# Patient Record
Sex: FEMALE | Race: WHITE | NOT HISPANIC OR LATINO
[De-identification: names, ages, dates, MRNs, and addresses within clinical notes are randomized per-mention and may not be internally consistent; named-entity substitution may affect disease eponyms.]

---

## 2022-07-18 PROBLEM — Z00.00 ENCOUNTER FOR PREVENTIVE HEALTH EXAMINATION: Status: ACTIVE | Noted: 2022-07-18

## 2022-07-19 ENCOUNTER — APPOINTMENT (OUTPATIENT)
Dept: PEDIATRIC ORTHOPEDIC SURGERY | Facility: CLINIC | Age: 22
End: 2022-07-19

## 2022-07-19 VITALS — WEIGHT: 138 LBS | BODY MASS INDEX: 22.18 KG/M2 | HEIGHT: 66 IN

## 2022-07-19 DIAGNOSIS — Z80.9 FAMILY HISTORY OF MALIGNANT NEOPLASM, UNSPECIFIED: ICD-10-CM

## 2022-07-19 DIAGNOSIS — Z83.49 FAMILY HISTORY OF OTHER ENDOCRINE, NUTRITIONAL AND METABOLIC DISEASES: ICD-10-CM

## 2022-07-19 PROCEDURE — 73564 X-RAY EXAM KNEE 4 OR MORE: CPT

## 2022-07-19 PROCEDURE — 99202 OFFICE O/P NEW SF 15 MIN: CPT

## 2022-07-19 NOTE — DATA REVIEWED
[de-identified] : X-ray evaluation of the right knee on 7/19/2022 (AP, lateral, skyline and tunnel views) reveals a loose fragment of the medial femoral condyle consistent with osteochondritis dissecans.

## 2022-07-19 NOTE — HISTORY OF PRESENT ILLNESS
[FreeTextEntry1] : This 22-year-old female is here for evaluation of right knee pain.  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  Of importance was that she was treated by me in 2016 for an osteochondritis dissecans of the medial femoral condyle which healed with conservative treatment.  She states that the knee is giving way and swells intermittently.  This is causing her significant difficulty with her ability to play sports.

## 2022-07-19 NOTE — ASSESSMENT
[FreeTextEntry1] : Osteochondritis dissecans right medial femoral condyle with loose fragment\par \par It is felt that this patient will require operative intervention but prior to that an MRI should be obtained.  I explained to the patient and her father that the surgery would be done arthroscopically and would entail excision of the loose fragment.  This does not appear to be in a weightbearing area and will not require most likely an oats procedure.

## 2022-07-19 NOTE — PHYSICAL EXAM
[FreeTextEntry1] : On physical examination the patient has tenderness in the region of the medial aspect of the medial femoral condyle.  There is no effusion.  There is no varus valgus or AP instability.

## 2022-07-19 NOTE — CONSULT LETTER
[Dear  ___] : Dear  [unfilled], [Consult Letter:] : I had the pleasure of evaluating your patient, [unfilled]. [Please see my note below.] : Please see my note below. [Consult Closing:] : Thank you very much for allowing me to participate in the care of this patient.  If you have any questions, please do not hesitate to contact me. [Sincerely,] : Sincerely, [FreeTextEntry3] : Dr Woo\par

## 2022-12-30 ENCOUNTER — APPOINTMENT (OUTPATIENT)
Dept: PEDIATRIC ORTHOPEDIC SURGERY | Facility: HOSPITAL | Age: 22
End: 2022-12-30

## 2023-03-10 ENCOUNTER — APPOINTMENT (OUTPATIENT)
Dept: PEDIATRIC ORTHOPEDIC SURGERY | Facility: HOSPITAL | Age: 23
End: 2023-03-10

## 2023-03-10 ENCOUNTER — TRANSCRIPTION ENCOUNTER (OUTPATIENT)
Age: 23
End: 2023-03-10

## 2023-03-16 ENCOUNTER — APPOINTMENT (OUTPATIENT)
Dept: PEDIATRIC ORTHOPEDIC SURGERY | Facility: CLINIC | Age: 23
End: 2023-03-16
Payer: COMMERCIAL

## 2023-03-16 VITALS — TEMPERATURE: 97.3 F | WEIGHT: 138 LBS | HEIGHT: 66 IN | BODY MASS INDEX: 22.18 KG/M2

## 2023-03-16 PROCEDURE — 99024 POSTOP FOLLOW-UP VISIT: CPT

## 2023-03-16 PROCEDURE — 73560 X-RAY EXAM OF KNEE 1 OR 2: CPT

## 2023-03-21 NOTE — ASSESSMENT
[FreeTextEntry1] : Osteochondritis dissecans right medial femoral condyle status post oats procedure\par \par Patient will be maintained in a knee immobilizer and will continue a nonweightbearing status for the next month.  When she returns from school she will make another appointment.  She has been given a prescription for physical therapy.\par

## 2023-03-21 NOTE — PHYSICAL EXAM
[FreeTextEntry1] : On physical examination the wounds are healing nicely.  There is no effusion at this time.  Patient can flex approximately 25 degrees.

## 2023-03-21 NOTE — DATA REVIEWED
[de-identified] : X-ray evaluation of the right knee on 3/16/2023 (AP and lateral views) reveals the area of osteochondritis dissecans without obvious abnormality.

## 2023-03-21 NOTE — HISTORY OF PRESENT ILLNESS
[FreeTextEntry1] : This 22-year-old female is doing well status post operative arthroscopy and oats procedure for an osteochondritis dissecans of the medial femoral condyle of the right knee.  Patient is being maintained in the knee extension splint nonweightbearing.

## 2023-05-19 ENCOUNTER — APPOINTMENT (OUTPATIENT)
Dept: PEDIATRIC ORTHOPEDIC SURGERY | Facility: CLINIC | Age: 23
End: 2023-05-19
Payer: COMMERCIAL

## 2023-05-19 VITALS
TEMPERATURE: 97 F | DIASTOLIC BLOOD PRESSURE: 70 MMHG | WEIGHT: 150 LBS | BODY MASS INDEX: 24.99 KG/M2 | SYSTOLIC BLOOD PRESSURE: 112 MMHG | HEIGHT: 65 IN

## 2023-05-19 DIAGNOSIS — M93.261 OSTEOCHONDRITIS DISSECANS, RIGHT KNEE: ICD-10-CM

## 2023-05-19 PROCEDURE — 99024 POSTOP FOLLOW-UP VISIT: CPT

## 2023-05-19 PROCEDURE — 73564 X-RAY EXAM KNEE 4 OR MORE: CPT

## 2023-05-19 NOTE — DATA REVIEWED
[de-identified] : X-ray evaluation of the right knee on 5/19/2023 (AP, lateral skyline and tunnel views) reveals the area of the oats procedure but without other abnormality.  Osteochondritis dissecans right medial femoral condyle

## 2023-05-19 NOTE — HISTORY OF PRESENT ILLNESS
[FreeTextEntry1] : This 22-year-old female is doing well status post oats procedure for osteochondritis dissecans lesion of the medial femoral condyle.  The patient has no pain at this time and a full range of motion.  She is still doing physical therapy.

## 2023-05-19 NOTE — PHYSICAL EXAM
Caller: BK CARPENTER    Relationship to patient:  ON  [facility]     Best call back number: [phone]    Chief complaint: LUMBAR/CANCER PATIENT     Type of visit: PROCEDURE EPIDURAL     Requested date: ASAP     If rescheduling, when is the original appointment: N/A    Additional notes: IF PATIENT IS NOT ELIGIBLE FOR PROCEDURE RIGHT NOW, PLEASE SCHEDULE FIRST AVAILABLE WHEN SHE IS ELIGIBLE  PLEASE.        [FreeTextEntry1] : On physical examination there is no effusion of the right knee.  There is no varus valgus or AP instability.  She can easily achieve a full range of motion.  There is no tenderness in the region of the medial femoral condyle.

## 2023-05-19 NOTE — ASSESSMENT
[FreeTextEntry1] : On physical examination there is no effusion of the right knee.  There is no varus valgus or AP instability.  She can easily achieve a full range of motion.  There is no tenderness in the region of the medial femoral condyle.\par \par Assessment:\par Osteochondritis dissecans right medial femoral condyle status post oats procedure\par \par The patient will continue physical therapy.  She will return in approximately 5 months for reevaluation.